# Patient Record
Sex: MALE | Race: WHITE | NOT HISPANIC OR LATINO | Employment: FULL TIME | URBAN - METROPOLITAN AREA
[De-identification: names, ages, dates, MRNs, and addresses within clinical notes are randomized per-mention and may not be internally consistent; named-entity substitution may affect disease eponyms.]

---

## 2020-01-23 PROBLEM — J30.89 ALLERGIC RHINITIS DUE TO HOUSE DUST MITE: Status: ACTIVE | Noted: 2020-01-23

## 2020-01-23 PROBLEM — R94.2 ABNORMAL PFTS (PULMONARY FUNCTION TESTS): Status: ACTIVE | Noted: 2020-01-23

## 2020-01-23 PROBLEM — J34.2 DEVIATED NASAL SEPTUM: Status: ACTIVE | Noted: 2020-01-23

## 2020-01-23 PROBLEM — H10.13 ALLERGIC CONJUNCTIVITIS OF BOTH EYES: Status: ACTIVE | Noted: 2020-01-23

## 2020-01-23 PROBLEM — J30.1 CHRONIC SEASONAL ALLERGIC RHINITIS DUE TO POLLEN: Status: ACTIVE | Noted: 2020-01-23

## 2020-02-06 ENCOUNTER — HOSPITAL ENCOUNTER (OUTPATIENT)
Dept: PULMONOLOGY | Facility: HOSPITAL | Age: 30
Discharge: HOME | End: 2020-02-06
Attending: ALLERGY & IMMUNOLOGY
Payer: COMMERCIAL

## 2020-02-06 DIAGNOSIS — R94.2 ABNORMAL PFTS (PULMONARY FUNCTION TESTS): ICD-10-CM

## 2020-02-06 PROCEDURE — 94060 EVALUATION OF WHEEZING: CPT | Performed by: INTERNAL MEDICINE

## 2020-02-06 PROCEDURE — 94060 EVALUATION OF WHEEZING: CPT

## 2020-02-06 PROCEDURE — 94760 N-INVAS EAR/PLS OXIMETRY 1: CPT

## 2020-02-06 PROCEDURE — 94729 DIFFUSING CAPACITY: CPT

## 2020-02-06 PROCEDURE — 94729 DIFFUSING CAPACITY: CPT | Performed by: INTERNAL MEDICINE

## 2020-02-06 PROCEDURE — 94726 PLETHYSMOGRAPHY LUNG VOLUMES: CPT | Performed by: INTERNAL MEDICINE

## 2020-02-06 PROCEDURE — 94726 PLETHYSMOGRAPHY LUNG VOLUMES: CPT

## 2020-02-06 RX ORDER — ALBUTEROL SULFATE 2.5 MG/3ML
2.5 SOLUTION RESPIRATORY (INHALATION) ONCE
Status: COMPLETED | OUTPATIENT
Start: 2020-02-06 | End: 2020-02-06

## 2020-02-06 RX ADMIN — ALBUTEROL SULFATE 2.5 MG: 2.5 SOLUTION RESPIRATORY (INHALATION) at 16:27

## 2020-06-30 PROBLEM — J34.3 NASAL TURBINATE HYPERTROPHY: Status: ACTIVE | Noted: 2020-06-30

## 2020-06-30 PROBLEM — J34.89 NASAL OBSTRUCTION: Status: ACTIVE | Noted: 2020-06-30

## 2020-09-30 PROBLEM — J45.50 SEVERE PERSISTENT ASTHMA WITHOUT COMPLICATION: Status: ACTIVE | Noted: 2020-07-08

## 2020-09-30 PROBLEM — Q67.6 PECTUS EXCAVATUM: Status: ACTIVE | Noted: 2020-07-08

## 2020-09-30 PROBLEM — J98.4 RESTRICTIVE LUNG DISEASE: Status: ACTIVE | Noted: 2020-07-08

## 2021-04-01 DIAGNOSIS — Z23 ENCOUNTER FOR IMMUNIZATION: ICD-10-CM

## 2021-04-11 ENCOUNTER — IMMUNIZATIONS (OUTPATIENT)
Dept: FAMILY MEDICINE CLINIC | Facility: HOSPITAL | Age: 31
End: 2021-04-11

## 2021-04-11 DIAGNOSIS — Z23 ENCOUNTER FOR IMMUNIZATION: Primary | ICD-10-CM

## 2021-04-11 PROCEDURE — 91300 SARS-COV-2 / COVID-19 MRNA VACCINE (PFIZER-BIONTECH) 30 MCG: CPT

## 2021-04-11 PROCEDURE — 0001A SARS-COV-2 / COVID-19 MRNA VACCINE (PFIZER-BIONTECH) 30 MCG: CPT

## 2021-05-02 ENCOUNTER — IMMUNIZATIONS (OUTPATIENT)
Dept: FAMILY MEDICINE CLINIC | Facility: HOSPITAL | Age: 31
End: 2021-05-02

## 2021-05-02 DIAGNOSIS — Z23 ENCOUNTER FOR IMMUNIZATION: Primary | ICD-10-CM

## 2021-05-02 PROCEDURE — 0002A SARS-COV-2 / COVID-19 MRNA VACCINE (PFIZER-BIONTECH) 30 MCG: CPT

## 2021-05-02 PROCEDURE — 91300 SARS-COV-2 / COVID-19 MRNA VACCINE (PFIZER-BIONTECH) 30 MCG: CPT

## 2021-09-28 ENCOUNTER — HOSPITAL ENCOUNTER (OUTPATIENT)
Dept: RADIOLOGY | Facility: HOSPITAL | Age: 31
Discharge: HOME | End: 2021-09-28
Attending: ALLERGY & IMMUNOLOGY
Payer: COMMERCIAL

## 2021-09-28 DIAGNOSIS — R94.2 ABNORMAL PFTS (PULMONARY FUNCTION TESTS): ICD-10-CM

## 2021-09-28 PROCEDURE — 71046 X-RAY EXAM CHEST 2 VIEWS: CPT

## 2021-10-13 ENCOUNTER — OFFICE VISIT (OUTPATIENT)
Dept: PULMONOLOGY | Facility: CLINIC | Age: 31
End: 2021-10-13
Payer: COMMERCIAL

## 2021-10-13 VITALS
BODY MASS INDEX: 20.53 KG/M2 | WEIGHT: 143.4 LBS | OXYGEN SATURATION: 98 % | TEMPERATURE: 97.4 F | SYSTOLIC BLOOD PRESSURE: 100 MMHG | HEIGHT: 70 IN | HEART RATE: 71 BPM | DIASTOLIC BLOOD PRESSURE: 60 MMHG

## 2021-10-13 DIAGNOSIS — J31.0 CHRONIC RHINITIS: Primary | ICD-10-CM

## 2021-10-13 DIAGNOSIS — K21.9 GASTROESOPHAGEAL REFLUX DISEASE WITHOUT ESOPHAGITIS: ICD-10-CM

## 2021-10-13 DIAGNOSIS — Q34.8 PRIMARY CILIARY DYSKINESIA: ICD-10-CM

## 2021-10-13 PROCEDURE — 99244 OFF/OP CNSLTJ NEW/EST MOD 40: CPT | Performed by: INTERNAL MEDICINE

## 2021-10-13 RX ORDER — MONTELUKAST SODIUM 10 MG/1
10 TABLET ORAL
Qty: 30 TABLET | Refills: 0 | Status: SHIPPED | OUTPATIENT
Start: 2021-10-13 | End: 2021-11-18 | Stop reason: SDUPTHER

## 2021-10-13 RX ORDER — OMEPRAZOLE 20 MG/1
20 TABLET, DELAYED RELEASE ORAL DAILY
Qty: 30 TABLET | Refills: 0 | Status: SHIPPED | OUTPATIENT
Start: 2021-10-13

## 2021-11-05 ENCOUNTER — TELEPHONE (OUTPATIENT)
Dept: PULMONOLOGY | Facility: CLINIC | Age: 31
End: 2021-11-05

## 2021-11-18 DIAGNOSIS — J31.0 CHRONIC RHINITIS: ICD-10-CM

## 2021-11-18 RX ORDER — MONTELUKAST SODIUM 10 MG/1
10 TABLET ORAL
Qty: 30 TABLET | Refills: 5 | Status: SHIPPED | OUTPATIENT
Start: 2021-11-18 | End: 2022-08-01

## 2022-02-05 ENCOUNTER — IMMUNIZATIONS (OUTPATIENT)
Dept: FAMILY MEDICINE CLINIC | Facility: HOSPITAL | Age: 32
End: 2022-02-05

## 2022-02-05 PROCEDURE — 0051A COVID-19 PFIZER VACC TRIS-SUCROSE GRAY CAP 0.3 ML: CPT

## 2022-02-05 PROCEDURE — 91305 COVID-19 PFIZER VACC TRIS-SUCROSE GRAY CAP 0.3 ML: CPT

## 2022-08-01 DIAGNOSIS — J31.0 CHRONIC RHINITIS: ICD-10-CM

## 2022-08-01 RX ORDER — MONTELUKAST SODIUM 10 MG/1
TABLET ORAL
Qty: 30 TABLET | Refills: 5 | Status: SHIPPED | OUTPATIENT
Start: 2022-08-01

## 2023-01-31 DIAGNOSIS — J31.0 CHRONIC RHINITIS: ICD-10-CM

## 2023-02-02 RX ORDER — MONTELUKAST SODIUM 10 MG/1
TABLET ORAL
Qty: 30 TABLET | Refills: 5 | Status: SHIPPED | OUTPATIENT
Start: 2023-02-02

## 2023-07-27 DIAGNOSIS — J31.0 CHRONIC RHINITIS: ICD-10-CM

## 2023-07-27 RX ORDER — MONTELUKAST SODIUM 10 MG/1
TABLET ORAL
Qty: 30 TABLET | Refills: 5 | Status: SHIPPED | OUTPATIENT
Start: 2023-07-27

## 2024-02-12 ENCOUNTER — TELEPHONE (OUTPATIENT)
Dept: PSYCHIATRY | Facility: CLINIC | Age: 34
End: 2024-02-12

## 2024-02-12 NOTE — TELEPHONE ENCOUNTER
Primary/Secondary/Tertiary: primary  Name of Insurance:   [employer]  Insurance Address:     City:     State:   Zip:   Insurance Phone Number:   [phone]  Policy Number:   [id number]  Group Number: [id number]  Subscriber:   self  Subscriber :     Subscriber Social:   Subscriber Address:     Subscriber Employment Status:   Subscriber Sex: m  Relationship to Subscriber: self

## 2024-02-12 NOTE — TELEPHONE ENCOUNTER
Patient has been added to the [facility] wait list without a referral.    Insurance: [employer]  Insurance Type:    Commercial [x]   Medicaid []   [facility] (if applicable)   Medicare []  Location Preference: [place]  Provider Preference: any  Virtual: Yes [] No [x]  Were outside resources sent: Yes [x] No []    Mailed outside resource packet  Added ins to account

## 2024-02-21 PROBLEM — H10.13 ALLERGIC CONJUNCTIVITIS OF BOTH EYES: Status: RESOLVED | Noted: 2020-01-23 | Resolved: 2024-02-21

## 2024-02-22 ENCOUNTER — APPOINTMENT (OUTPATIENT)
Dept: URGENT CARE | Age: 34
End: 2024-02-22

## 2024-04-20 ENCOUNTER — APPOINTMENT (OUTPATIENT)
Dept: URGENT CARE | Age: 34
End: 2024-04-20

## 2024-07-12 ENCOUNTER — HOSPITAL ENCOUNTER (OUTPATIENT)
Dept: RADIOLOGY | Age: 34
Discharge: HOME | End: 2024-07-12
Payer: COMMERCIAL

## 2024-07-12 DIAGNOSIS — R09.81 CHRONIC NASAL CONGESTION: ICD-10-CM

## 2024-07-12 PROCEDURE — 70486 CT MAXILLOFACIAL W/O DYE: CPT

## 2024-11-20 ENCOUNTER — TELEPHONE (OUTPATIENT)
Age: 34
End: 2024-11-20

## 2024-11-27 NOTE — TELEPHONE ENCOUNTER
Contacted patient off of Medication Management  wait list to verify needs of services in attempts to offer appt. LVM for patient to contact intake dept  in regards to scheduling.    Attempt #2  Pt removed from wait list.